# Patient Record
Sex: MALE | Race: BLACK OR AFRICAN AMERICAN | Employment: FULL TIME | URBAN - METROPOLITAN AREA
[De-identification: names, ages, dates, MRNs, and addresses within clinical notes are randomized per-mention and may not be internally consistent; named-entity substitution may affect disease eponyms.]

---

## 2020-03-02 ENCOUNTER — OFFICE VISIT (OUTPATIENT)
Dept: URGENT CARE | Facility: URGENT CARE | Age: 50
End: 2020-03-02
Payer: COMMERCIAL

## 2020-03-02 VITALS
WEIGHT: 226.4 LBS | OXYGEN SATURATION: 96 % | BODY MASS INDEX: 31.69 KG/M2 | SYSTOLIC BLOOD PRESSURE: 130 MMHG | HEIGHT: 71 IN | RESPIRATION RATE: 16 BRPM | DIASTOLIC BLOOD PRESSURE: 75 MMHG | TEMPERATURE: 98.1 F | HEART RATE: 51 BPM

## 2020-03-02 DIAGNOSIS — J34.89 SINUS DRAINAGE: ICD-10-CM

## 2020-03-02 DIAGNOSIS — R09.81 CONGESTION OF PARANASAL SINUS: ICD-10-CM

## 2020-03-02 DIAGNOSIS — H69.93 DYSFUNCTION OF BOTH EUSTACHIAN TUBES: Primary | ICD-10-CM

## 2020-03-02 PROCEDURE — 99203 OFFICE O/P NEW LOW 30 MIN: CPT | Performed by: PHYSICIAN ASSISTANT

## 2020-03-02 RX ORDER — FLUTICASONE PROPIONATE 50 MCG
2 SPRAY, SUSPENSION (ML) NASAL DAILY
Qty: 16 ML | Refills: 0 | Status: SHIPPED | OUTPATIENT
Start: 2020-03-02

## 2020-03-02 RX ORDER — ATORVASTATIN CALCIUM 40 MG/1
40 TABLET, FILM COATED ORAL
COMMUNITY
Start: 2019-09-09

## 2020-03-02 RX ORDER — AMOXICILLIN 875 MG
875 TABLET ORAL 2 TIMES DAILY
Qty: 20 TABLET | Refills: 0 | Status: SHIPPED | OUTPATIENT
Start: 2020-03-02 | End: 2020-03-12

## 2020-03-02 ASSESSMENT — MIFFLIN-ST. JEOR: SCORE: 1896.13

## 2020-03-02 NOTE — PROGRESS NOTES
"SUBJECTIVE:   Ayad Zhou is a 49 year old male presenting with a chief complaint of sinus congestion, ears feeling plugged.  Onset of symptoms was 8 day(s) ago.  Course of illness is worsening.    Severity moderate  Current and Associated symptoms: runny nose, stuffy nose and cough - non-productive  Treatment measures tried include OTC Cough med.  Predisposing factors include recent illness.    PMH  Overweight    ALLERGIES   No Known Allergies      Social History     Tobacco Use     Smoking status: Never Smoker     Smokeless tobacco: Never Used   Substance Use Topics     Alcohol use: Yes     FAMILY HX  Allergies  HTN  OBesity    ROS:  CONSTITUTIONAL:NEGATIVE for fever, chills, change in weight  INTEGUMENTARY/SKIN: NEGATIVE for worrisome rashes, moles or lesions  EYES: NEGATIVE for vision changes or irritation  ENT/MOUTH: POSITIVE for ear pressure, nasal congestion, drainage  RESP:POSITIVE for cough-non productive  CV: NEGATIVE for chest pain, palpitations or peripheral edema  GI: NEGATIVE for nausea, abdominal pain, heartburn, or change in bowel habits  : negative for and dysuria  MUSCULOSKELETAL: NEGATIVE for significant arthralgias or myalgia  NEURO: NEGATIVE for weakness, dizziness or paresthesias    OBJECTIVE  :/75   Pulse 51   Temp 98.1  F (36.7  C) (Oral)   Resp 16   Ht 1.791 m (5' 10.5\")   Wt 102.7 kg (226 lb 6.4 oz)   SpO2 96%   BMI 32.03 kg/m    GENERAL APPEARANCE: healthy, alert and no distress  EYES: EOMI,  PERRL, conjunctiva clear  HENT: TM's normal bilaterally, nasal turbinates erythematous, swollen and rhinorrhea   NECK: supple, nontender, no lymphadenopathy  RESP: lungs clear to auscultation - no rales, rhonchi or wheezes  CV: regular rates and rhythm, normal S1 S2, no murmur noted  ABDOMEN:  soft, nontender, no HSM or masses and bowel sounds normal  NEURO: Normal strength and tone, sensory exam grossly normal,  normal speech and mentation  SKIN: no suspicious lesions or " chuck    ASSESSMENT/PLAN      ICD-10-CM    1. Dysfunction of both eustachian tubes H69.83 fluticasone (FLONASE) 50 MCG/ACT nasal spray   2. Sinus drainage J34.89 amoxicillin (AMOXIL) 875 MG tablet   3. Congestion of paranasal sinus R09.81 amoxicillin (AMOXIL) 875 MG tablet       Orders Placed This Encounter     atorvastatin (LIPITOR) 40 MG tablet     amoxicillin (AMOXIL) 875 MG tablet     fluticasone (FLONASE) 50 MCG/ACT nasal spray       OTC advil cold and sinus  Fluids, rest  Follow up as needed  See orders in Epic